# Patient Record
Sex: FEMALE | Race: WHITE | NOT HISPANIC OR LATINO | Employment: FULL TIME | URBAN - METROPOLITAN AREA
[De-identification: names, ages, dates, MRNs, and addresses within clinical notes are randomized per-mention and may not be internally consistent; named-entity substitution may affect disease eponyms.]

---

## 2019-08-22 ENCOUNTER — OFFICE VISIT (OUTPATIENT)
Dept: URGENT CARE | Facility: CLINIC | Age: 59
End: 2019-08-22
Payer: COMMERCIAL

## 2019-08-22 VITALS
RESPIRATION RATE: 10 BRPM | TEMPERATURE: 97 F | HEART RATE: 68 BPM | DIASTOLIC BLOOD PRESSURE: 84 MMHG | SYSTOLIC BLOOD PRESSURE: 118 MMHG | BODY MASS INDEX: 34.93 KG/M2 | HEIGHT: 61 IN | OXYGEN SATURATION: 99 % | WEIGHT: 185 LBS

## 2019-08-22 DIAGNOSIS — R42 VERTIGO: Primary | ICD-10-CM

## 2019-08-22 DIAGNOSIS — R11.0 NAUSEA: ICD-10-CM

## 2019-08-22 PROCEDURE — 3008F PR BODY MASS INDEX (BMI) DOCUMENTED: ICD-10-PCS | Mod: CPTII,S$GLB,, | Performed by: INTERNAL MEDICINE

## 2019-08-22 PROCEDURE — 99214 PR OFFICE/OUTPT VISIT, EST, LEVL IV, 30-39 MIN: ICD-10-PCS | Mod: 25,S$GLB,, | Performed by: INTERNAL MEDICINE

## 2019-08-22 PROCEDURE — S0119 PR ONDANSETRON, ORAL, 4MG: ICD-10-PCS | Mod: S$GLB,,, | Performed by: INTERNAL MEDICINE

## 2019-08-22 PROCEDURE — 96372 PR INJECTION,THERAP/PROPH/DIAG2ST, IM OR SUBCUT: ICD-10-PCS | Mod: S$GLB,,, | Performed by: INTERNAL MEDICINE

## 2019-08-22 PROCEDURE — 99214 OFFICE O/P EST MOD 30 MIN: CPT | Mod: 25,S$GLB,, | Performed by: INTERNAL MEDICINE

## 2019-08-22 PROCEDURE — 96372 THER/PROPH/DIAG INJ SC/IM: CPT | Mod: S$GLB,,, | Performed by: INTERNAL MEDICINE

## 2019-08-22 PROCEDURE — S0119 ONDANSETRON 4 MG: HCPCS | Mod: S$GLB,,, | Performed by: INTERNAL MEDICINE

## 2019-08-22 PROCEDURE — 3008F BODY MASS INDEX DOCD: CPT | Mod: CPTII,S$GLB,, | Performed by: INTERNAL MEDICINE

## 2019-08-22 RX ORDER — ERGOCALCIFEROL 1.25 MG/1
CAPSULE ORAL
Refills: 1 | COMMUNITY
Start: 2019-08-14

## 2019-08-22 RX ORDER — LEVOTHYROXINE SODIUM 88 UG/1
TABLET ORAL
COMMUNITY

## 2019-08-22 RX ORDER — ORPHENADRINE CITRATE 100 MG/1
TABLET, EXTENDED RELEASE ORAL
COMMUNITY

## 2019-08-22 RX ORDER — BETAMETHASONE SODIUM PHOSPHATE AND BETAMETHASONE ACETATE 3; 3 MG/ML; MG/ML
9 INJECTION, SUSPENSION INTRA-ARTICULAR; INTRALESIONAL; INTRAMUSCULAR; SOFT TISSUE ONCE
Status: COMPLETED | OUTPATIENT
Start: 2019-08-22 | End: 2019-08-22

## 2019-08-22 RX ORDER — ONDANSETRON 4 MG/1
4 TABLET, FILM COATED ORAL EVERY 8 HOURS PRN
Qty: 20 TABLET | Refills: 0 | Status: SHIPPED | OUTPATIENT
Start: 2019-08-22

## 2019-08-22 RX ORDER — FLUTICASONE FUROATE AND VILANTEROL TRIFENATATE 200; 25 UG/1; UG/1
POWDER RESPIRATORY (INHALATION)
Refills: 5 | COMMUNITY
Start: 2019-07-23

## 2019-08-22 RX ORDER — ALBUTEROL SULFATE 90 UG/1
AEROSOL, METERED RESPIRATORY (INHALATION)
COMMUNITY

## 2019-08-22 RX ORDER — HYDROXYZINE HYDROCHLORIDE 25 MG/1
TABLET, FILM COATED ORAL
Refills: 1 | COMMUNITY
Start: 2019-07-22

## 2019-08-22 RX ORDER — ONDANSETRON 4 MG/1
TABLET, FILM COATED ORAL
COMMUNITY

## 2019-08-22 RX ORDER — HYDROXYZINE HYDROCHLORIDE 25 MG/1
TABLET, FILM COATED ORAL
COMMUNITY

## 2019-08-22 RX ORDER — ONDANSETRON 4 MG/1
4 TABLET, ORALLY DISINTEGRATING ORAL
Status: COMPLETED | OUTPATIENT
Start: 2019-08-22 | End: 2019-08-22

## 2019-08-22 RX ORDER — LEVOTHYROXINE SODIUM 88 UG/1
TABLET ORAL
Refills: 1 | COMMUNITY
Start: 2019-07-22

## 2019-08-22 RX ORDER — ORPHENADRINE CITRATE 100 MG/1
TABLET, EXTENDED RELEASE ORAL
Refills: 1 | COMMUNITY
Start: 2019-06-30

## 2019-08-22 RX ORDER — LISINOPRIL 40 MG/1
TABLET ORAL
Refills: 1 | COMMUNITY
Start: 2019-06-05

## 2019-08-22 RX ORDER — MECLIZINE HYDROCHLORIDE 25 MG/1
25 TABLET ORAL 3 TIMES DAILY PRN
Qty: 30 TABLET | Refills: 0 | Status: SHIPPED | OUTPATIENT
Start: 2019-08-22

## 2019-08-22 RX ORDER — ERGOCALCIFEROL 1.25 MG/1
CAPSULE ORAL
COMMUNITY

## 2019-08-22 RX ORDER — FLUTICASONE FUROATE AND VILANTEROL 200; 25 UG/1; UG/1
POWDER RESPIRATORY (INHALATION)
COMMUNITY

## 2019-08-22 RX ORDER — LISINOPRIL 40 MG/1
TABLET ORAL
COMMUNITY

## 2019-08-22 RX ORDER — ONDANSETRON 4 MG/1
TABLET, FILM COATED ORAL
Refills: 0 | COMMUNITY
Start: 2019-06-05

## 2019-08-22 RX ADMIN — ONDANSETRON 4 MG: 4 TABLET, ORALLY DISINTEGRATING ORAL at 11:08

## 2019-08-22 RX ADMIN — BETAMETHASONE SODIUM PHOSPHATE AND BETAMETHASONE ACETATE 9 MG: 3; 3 INJECTION, SUSPENSION INTRA-ARTICULAR; INTRALESIONAL; INTRAMUSCULAR; SOFT TISSUE at 11:08

## 2019-08-22 NOTE — PROGRESS NOTES
"Subjective:       Patient ID: Lauren Ragland is a 59 y.o. female.    Vitals:  height is 5' 1" (1.549 m) and weight is 83.9 kg (185 lb). Her oral temperature is 97 °F (36.1 °C). Her blood pressure is 118/84 and her pulse is 68. Her respiration is 10 and oxygen saturation is 99%.     Chief Complaint: Dizziness    Pt c/o dizziness ( last night), nausea, and vomiting (3 times), start today   Pt saw chiropractor to day stated she had her neck adjusted;    Dizziness:   Chronicity:  New  Onset:  Yesterday  Progression since onset:  Unchanged  Frequency:  Constantly  Duration:  5 minutes  Dizziness characteristics:  Off-balance   Associated symptoms: nausea and vomiting.no fever, no headaches, no weakness, no light-headedness and no chest pain.  Exacerbated by: rotation of head; and standing up.  Treatments tried:  Nothing  Emesis    This is a new problem. The current episode started today. The problem occurs 2 to 4 times per day. The problem has been unchanged. The emesis has an appearance of stomach contents. Associated symptoms include chills, coughing and dizziness. Pertinent negatives include no arthralgias, chest pain, diarrhea, fever, headaches or myalgias. She has tried nothing for the symptoms.       Constitution: Positive for chills. Negative for fatigue and fever.   HENT: Positive for congestion (nasal congestion), postnasal drip and sinus pressure. Negative for sore throat.    Neck: Negative for painful lymph nodes.   Cardiovascular: Negative for chest pain and leg swelling.   Eyes: Negative for double vision and blurred vision.   Respiratory: Positive for cough. Negative for shortness of breath.    Gastrointestinal: Positive for nausea and vomiting. Negative for diarrhea.   Genitourinary: Negative for dysuria, frequency, urgency and history of kidney stones.   Musculoskeletal: Negative for joint pain, joint swelling, muscle cramps and muscle ache.   Skin: Negative for color change, pale, rash and bruising. "   Allergic/Immunologic: Negative for seasonal allergies.   Neurological: Positive for dizziness and loss of balance. Negative for history of vertigo, light-headedness, passing out and headaches.   Hematologic/Lymphatic: Negative for swollen lymph nodes.   Psychiatric/Behavioral: Negative for nervous/anxious, sleep disturbance and depression. The patient is not nervous/anxious.        Objective:      Physical Exam   Constitutional: She is oriented to person, place, and time. She appears well-developed and well-nourished.   HENT:   Head: Normocephalic and atraumatic.   Eyes: Pupils are equal, round, and reactive to light. Conjunctivae and EOM are normal.   Neck: Normal range of motion. Neck supple.   Cardiovascular: Normal rate and regular rhythm.   Pulmonary/Chest: Effort normal and breath sounds normal.   Neurological: She is alert and oriented to person, place, and time. She displays normal reflexes. No cranial nerve deficit or sensory deficit. She exhibits normal muscle tone. Coordination normal.   Nursing note and vitals reviewed.      Assessment:       1. Vertigo    2. Nausea        Plan:         Vertigo  -     betamethasone acetate-betamethasone sodium phosphate injection 9 mg  -     meclizine (ANTIVERT) 25 mg tablet; Take 1 tablet (25 mg total) by mouth 3 (three) times daily as needed.  Dispense: 30 tablet; Refill: 0    Nausea  -     ondansetron disintegrating tablet 4 mg  -     ondansetron (ZOFRAN) 4 MG tablet; Take 1 tablet (4 mg total) by mouth every 8 (eight) hours as needed for Nausea.  Dispense: 20 tablet; Refill: 0

## 2020-06-09 ENCOUNTER — OFFICE VISIT (OUTPATIENT)
Dept: URGENT CARE | Facility: CLINIC | Age: 60
End: 2020-06-09
Payer: COMMERCIAL

## 2020-06-09 VITALS
RESPIRATION RATE: 19 BRPM | HEART RATE: 83 BPM | SYSTOLIC BLOOD PRESSURE: 142 MMHG | HEIGHT: 61 IN | TEMPERATURE: 98 F | DIASTOLIC BLOOD PRESSURE: 91 MMHG | BODY MASS INDEX: 32.85 KG/M2 | WEIGHT: 174 LBS

## 2020-06-09 DIAGNOSIS — S91.219A LACERATION OF NAIL BED OF TOE, INITIAL ENCOUNTER: ICD-10-CM

## 2020-06-09 DIAGNOSIS — S91.209A TRAUMATIC AVULSION OF NAIL PLATE OF TOE, INITIAL ENCOUNTER: Primary | ICD-10-CM

## 2020-06-09 PROCEDURE — 11760 REPAIR OF NAIL BED: CPT | Mod: S$GLB,,, | Performed by: STUDENT IN AN ORGANIZED HEALTH CARE EDUCATION/TRAINING PROGRAM

## 2020-06-09 PROCEDURE — 11760 NAIL REMOVAL: ICD-10-PCS | Mod: S$GLB,,, | Performed by: STUDENT IN AN ORGANIZED HEALTH CARE EDUCATION/TRAINING PROGRAM

## 2020-06-09 PROCEDURE — 99214 OFFICE O/P EST MOD 30 MIN: CPT | Mod: 25,S$GLB,, | Performed by: STUDENT IN AN ORGANIZED HEALTH CARE EDUCATION/TRAINING PROGRAM

## 2020-06-09 PROCEDURE — 99214 PR OFFICE/OUTPT VISIT, EST, LEVL IV, 30-39 MIN: ICD-10-PCS | Mod: 25,S$GLB,, | Performed by: STUDENT IN AN ORGANIZED HEALTH CARE EDUCATION/TRAINING PROGRAM

## 2020-06-09 RX ORDER — MUPIROCIN 20 MG/G
OINTMENT TOPICAL 2 TIMES DAILY
Qty: 22 G | Refills: 0 | Status: SHIPPED | OUTPATIENT
Start: 2020-06-09 | End: 2020-06-14

## 2020-06-09 RX ORDER — HYDROCODONE BITARTRATE AND ACETAMINOPHEN 5; 325 MG/1; MG/1
1 TABLET ORAL EVERY 8 HOURS PRN
Qty: 5 TABLET | Refills: 0 | Status: SHIPPED | OUTPATIENT
Start: 2020-06-09 | End: 2020-06-11

## 2020-06-09 RX ORDER — ONDANSETRON 4 MG/1
4 TABLET, ORALLY DISINTEGRATING ORAL EVERY 6 HOURS PRN
Qty: 5 TABLET | Refills: 0 | Status: SHIPPED | OUTPATIENT
Start: 2020-06-09 | End: 2020-06-11

## 2020-06-09 RX ORDER — DOXYCYCLINE 100 MG/1
100 CAPSULE ORAL EVERY 12 HOURS
Qty: 10 CAPSULE | Refills: 0 | Status: SHIPPED | OUTPATIENT
Start: 2020-06-09 | End: 2020-06-14

## 2020-06-10 NOTE — PROGRESS NOTES
"Subjective:       Patient ID: Lauren Ragland is a 60 y.o. female.    Vitals:  height is 5' 1" (1.549 m) and weight is 78.9 kg (174 lb). Her tympanic temperature is 98.1 °F (36.7 °C). Her blood pressure is 142/91 (abnormal) and her pulse is 83. Her respiration is 19.     Chief Complaint: Foot Injury    Pt presents for L great toe injury. Reports ~1h PTA she opened a door and hit her L distal toe, now with bleeding and loose toenail. Denied toe numbness or weakness. States tetanus UTD.    Foot Injury    The incident occurred less than 1 hour ago. Incident location: at [facility]. The injury mechanism was a direct blow. The pain is present in the left toes and left foot. The quality of the pain is described as aching. The pain is at a severity of 10/10. The pain is severe. The pain has been constant since onset. Pertinent negatives include no inability to bear weight, loss of motion, muscle weakness or numbness. She reports no foreign bodies present. The symptoms are aggravated by movement, palpation and weight bearing. She has tried nothing for the symptoms. The treatment provided no relief.       Constitution: Negative for fatigue.   HENT: Negative for facial swelling and facial trauma.    Neck: Negative for neck stiffness.   Cardiovascular: Negative for chest trauma.   Eyes: Negative for eye trauma, double vision and blurred vision.   Gastrointestinal: Negative for abdominal trauma, abdominal pain and rectal bleeding.   Genitourinary: Negative for hematuria, missed menses, genital trauma and pelvic pain.   Musculoskeletal: Negative for pain, trauma, joint swelling and abnormal ROM of joint.   Skin: Positive for wound and avulsion. Negative for color change, abrasion, laceration and bruising.   Neurological: Negative for dizziness, history of vertigo, light-headedness, coordination disturbances, altered mental status, loss of consciousness and numbness.   Hematologic/Lymphatic: Negative for history of bleeding " "disorder.   Psychiatric/Behavioral: Negative for altered mental status, confusion, agitation and sleep disturbance.       Objective:       Vitals:    06/09/20 1915   BP: (!) 142/91   Pulse: 83   Resp: 19   Temp: 98.1 °F (36.7 °C)   TempSrc: Tympanic   SpO2: Comment: UTO-artificial nails   Weight: 78.9 kg (174 lb)   Height: 5' 1" (1.549 m)     Physical Exam   Constitutional: She is oriented to person, place, and time. She appears well-developed and well-nourished. No distress.   HENT:   Head: Normocephalic and atraumatic.   Right Ear: Hearing and external ear normal.   Left Ear: Hearing and external ear normal.   Nose: Nose normal.   Mouth/Throat: Mucous membranes are normal.   Eyes: Conjunctivae, EOM and lids are normal. Right eye exhibits no discharge. Left eye exhibits no discharge. No scleral icterus.   Neck: Normal range of motion. Neck supple.   Cardiovascular: Normal rate, regular rhythm and intact distal pulses.   Abdominal: Normal appearance.   Musculoskeletal: Normal range of motion. She exhibits tenderness (over L great toe injury). She exhibits no edema or deformity.   Neurological: She is alert and oriented to person, place, and time. No cranial nerve deficit (CN II-XII grossly intact) or sensory deficit. She exhibits normal muscle tone.   Skin: Skin is warm, dry, not pale and no rash.   L great toe with distal dorsal abrasion and toe nail avulsion of lateral edge and at nailbed matrix with injury to nailbed matrix; bleeding present, unresolved with 10 min pressure  After nail removal, small 1cm laceration near medial nail fold with bleeding, controlled with sutures   Psychiatric: She has a normal mood and affect. Her speech is normal and behavior is normal. Judgment and thought content normal. Cognition and memory are normal.   Nursing note and vitals reviewed.        Assessment:       1. Traumatic avulsion of nail plate of toe, initial encounter    2. Laceration of nail bed of toe, initial encounter  "       Plan:         Traumatic avulsion of nail plate of toe, initial encounter  -     doxycycline (VIBRAMYCIN) 100 MG Cap; Take 1 capsule (100 mg total) by mouth every 12 (twelve) hours. for 5 days  Dispense: 10 capsule; Refill: 0  -     ondansetron (ZOFRAN-ODT) 4 MG TbDL; Take 1 tablet (4 mg total) by mouth every 6 (six) hours as needed.  Dispense: 5 tablet; Refill: 0  -     HYDROcodone-acetaminophen (NORCO) 5-325 mg per tablet; Take 1 tablet by mouth every 8 (eight) hours as needed for Pain.  Dispense: 5 tablet; Refill: 0  -     Ambulatory referral/consult to Podiatry  -     mupirocin (BACTROBAN) 2 % ointment; Apply topically 2 (two) times daily. With dressing changes for 5 days  Dispense: 22 g; Refill: 0    Laceration of nail bed of toe, initial encounter  -     doxycycline (VIBRAMYCIN) 100 MG Cap; Take 1 capsule (100 mg total) by mouth every 12 (twelve) hours. for 5 days  Dispense: 10 capsule; Refill: 0  -     ondansetron (ZOFRAN-ODT) 4 MG TbDL; Take 1 tablet (4 mg total) by mouth every 6 (six) hours as needed.  Dispense: 5 tablet; Refill: 0  -     HYDROcodone-acetaminophen (NORCO) 5-325 mg per tablet; Take 1 tablet by mouth every 8 (eight) hours as needed for Pain.  Dispense: 5 tablet; Refill: 0  -     Ambulatory referral/consult to Podiatry  -     mupirocin (BACTROBAN) 2 % ointment; Apply topically 2 (two) times daily. With dressing changes for 5 days  Dispense: 22 g; Refill: 0    Medication, wound care, and diagnosis reviewed with patient, questions answered, and return precautions given    Follow up in about 5 days (around 6/14/2020) for suture removal.    Man Goddard MD/MPH  [facility]

## 2020-06-10 NOTE — PROCEDURES
Nail Removal  Date/Time: 6/9/2020 7:15 PM  Performed by: Man Goddard MD  Authorized by: Man Goddard MD     Consent Done?:  Yes (Verbal)    Location:  Left foot  Location detail:  Left big toe  Anesthesia:  Digital block  Local anesthetic: lidocaine 1% without epinephrine  Anesthetic total (ml):  2.5  Preparation:  Skin prepped with alcohol    Amount removed:  Complete  Wedge excision of skin of nail fold: No    Nail bed sutured?: Yes    Nail bed suture material: 5-0 monofilament.  Number of sutures:  3  Nail matrix removed:  None  Removed nail replaced and anchored: No    Dressing applied:  Antibiotic ointment and non-adhesive packing strip  Patient tolerance:  Patient tolerated the procedure well with no immediate complications

## 2020-06-10 NOTE — PATIENT INSTRUCTIONS
Detached Fingernail or Toenail  A detached nail is when the nail becomes  from the area underneath it (the nail bed). This often means losing all or part of the nail. An injury to your finger or toe is often the cause. It can also be caused by an infection around or under the nail.  Sometimes there is a cut in the nail bed or a bone fracture under the nail. In most cases, the nail will grow back from the area under the cuticle (the matrix). A fingernail takes about 4 to 6 months to grow back. A toenail takes about 12 months to grow back. If the nail bed or matrix was damaged, the nail may grow back with a rough or abnormal shape. In some cases the nail may not grow back at all.    There may be damage or a cut to the nail bed. This may need to be repaired. Often this is done with stitches. If the nail is still in good condition, it might be cleaned and trimmed, then put back in place. This is done to help and protect the new nail as it grows back. It also prevents the nail bed from drying out.  Home care  · Keep the injured part raised to reduce pain and swelling. This is important, especially in the first 48 hours.  · Apply an ice pack for up to 20 minutes. Do this every 3 to 6 hours during the first 24 to 48 hours. Keep using ice packs to ease pain and swelling as needed. To make an ice pack, put ice cubes in a plastic bag that seals at the top. Wrap the bag in a clean, thin towel or cloth. Never put ice or an ice pack directly on the skin. The ice pack can be put right on a wrap, cast, or splint. As the ice melts, be careful not to get any wrap, cast, or splint wet.  · The nail bed is moist, soft, and sensitive. It needs to be protected from injury for the first 7 to 10 days until it dries out and becomes hard. Keep it covered with a nonstick dressing or a bandage without adhesive.  · When a dressing is placed on an exposed nail bed, it may stick and be hard to remove if left in place more than 24  hours. Unless you were told otherwise, change dressings every 24 hours. If needed, soak the dressing off while holding it under warm running water. Then lightly pat the wound dry. Apply a layer of antibiotic ointment before putting on the new dressing or bandage. This will help keep it from sticking. Use a nonstick dressing with the antibiotic ointment under the outer dressing.  · If an X-ray showed that you have a fracture, it will take about 4 weeks for this to heal. The injured part should be protected with a splint or tape while it is healing.  · If you were given antibiotics to prevent infection, take them as directed until they are all gone.  Medicine  · You can take over-the-counter medicine for pain, unless you were given a different pain medicine to use. Talk with your provider before using these medicines if you have chronic liver or kidney disease. Also talk with your provider if you ever had a stomach ulcer or GI bleeding, or are taking blood thinner medicines.  · If you were given antibiotics, take them until they are done. It is important to finish the antibiotics even if the wound looks better. This is to make sure the infection has cleared.  Follow-up care  Follow up with your healthcare provider, or as advised. If X-rays were taken, you will be told of any new findings that may affect your care.  When to seek medical advice  Call your healthcare provider right away if any of these occur:  · Pain or swelling increases  · Redness around the nail  · Pus (creamy white or yellow fluid) draining from the nail  · Fever of 100.4ºF (38ºC) or higher, or as directed by your provider  Date Last Reviewed: 8/1/2016 © 2000-2017 [employer]. [address] [place] [address], [place] [address]. All rights reserved. This information is not intended as a substitute for professional medical care. Always follow your healthcare professional's instructions.

## 2020-06-17 ENCOUNTER — CLINICAL SUPPORT (OUTPATIENT)
Dept: URGENT CARE | Facility: CLINIC | Age: 60
End: 2020-06-17
Payer: COMMERCIAL

## 2020-06-17 VITALS
DIASTOLIC BLOOD PRESSURE: 92 MMHG | WEIGHT: 174 LBS | HEART RATE: 79 BPM | TEMPERATURE: 98 F | OXYGEN SATURATION: 96 % | SYSTOLIC BLOOD PRESSURE: 142 MMHG | BODY MASS INDEX: 32.85 KG/M2 | HEIGHT: 61 IN

## 2020-06-17 DIAGNOSIS — Z48.02 ENCOUNTER FOR REMOVAL OF SUTURES: Primary | ICD-10-CM

## 2020-06-17 PROCEDURE — 99024 SUTURE REMOVAL: ICD-10-PCS | Mod: S$GLB,,, | Performed by: PHYSICIAN ASSISTANT

## 2020-06-17 PROCEDURE — 99499 UNLISTED E&M SERVICE: CPT | Mod: S$GLB,,, | Performed by: PHYSICIAN ASSISTANT

## 2020-06-17 PROCEDURE — 99024 POSTOP FOLLOW-UP VISIT: CPT | Mod: S$GLB,,, | Performed by: PHYSICIAN ASSISTANT

## 2020-06-17 PROCEDURE — 99499 NO LOS: ICD-10-PCS | Mod: S$GLB,,, | Performed by: PHYSICIAN ASSISTANT

## 2020-06-17 NOTE — PROGRESS NOTES
"Subjective:       Patient ID: Lauren Ragland is a 60 y.o. female.    Vitals:  height is 5' 1" (1.549 m) and weight is 78.9 kg (174 lb). Her tympanic temperature is 97.5 °F (36.4 °C). Her blood pressure is 142/92 (abnormal) and her pulse is 79. Her oxygen saturation is 96%.     Chief Complaint: Suture / Staple Removal    Suture / Staple Removal  The sutures were placed 7 to 10 days ago (06/09/2020). She tried oral antibiotics and antibiotic ointment use since the wound repair. The treatment provided moderate relief. Her temperature was unmeasured prior to arrival. There has been no drainage from the wound. There is no redness present. There is no swelling present. There is no pain present. She has no difficulty moving the affected extremity or digit.       Constitution: Negative for chills, fatigue and fever.   HENT: Negative for congestion and sore throat.    Neck: Negative for painful lymph nodes.   Cardiovascular: Negative for chest pain and leg swelling.   Eyes: Negative for double vision and blurred vision.   Respiratory: Negative for cough and shortness of breath.    Gastrointestinal: Negative for nausea, vomiting and diarrhea.   Genitourinary: Negative for dysuria, frequency, urgency and history of kidney stones.   Musculoskeletal: Negative for joint pain, joint swelling, muscle cramps and muscle ache.   Skin: Negative for color change, pale, rash and bruising.   Allergic/Immunologic: Negative for seasonal allergies.   Neurological: Negative for dizziness, history of vertigo, light-headedness, passing out and headaches.   Hematologic/Lymphatic: Negative for swollen lymph nodes.   Psychiatric/Behavioral: Negative for nervous/anxious, sleep disturbance and depression. The patient is not nervous/anxious.        Objective:      Physical Exam   Skin:   3 sutures in place left great toe. No signs of infection. No redness or drainage         Assessment:       1. Encounter for removal of sutures        Plan:     "   3 sutures removed  No complications  No signs of infection    Encounter for removal of sutures  -     Suture Removal    Labs reviewed, pertinent imaging reviewed, previous medical records, medical history, surgical history, social history, family history reviewed.       Patient Instructions   Please return for any concerns      Please follow up with podiatry 1 week      Please return here or go to the Emergency Department for any concerns or worsening of condition.  If you were prescribed antibiotics, please take them to completion.  If you were prescribed a narcotic medication, do not drive or operate heavy equipment or machinery while taking these medications.  Please follow up with your primary care doctor or specialist as needed.    If you  smoke, please stop smoking.

## 2020-06-17 NOTE — PROCEDURES
Suture Removal    Date/Time: 6/17/2020 5:30 PM  Location procedure was performed: [facility]  Performed by: Jessica Stewart PA-C  Authorized by: Jessica Stewart PA-C   Body area: lower extremity  Location details: left big toe  Description of findings: well healed, no infection   Wound Appearance: clean and well healed  Sutures Removed: 3  Post-removal: bandaid applied  Facility: sutures placed in this facility  Complications: No  Estimated blood loss (mL): 0  Specimens: No  Implants: No  Patient tolerance: Patient tolerated the procedure well with no immediate complications

## 2020-06-17 NOTE — PATIENT INSTRUCTIONS
Please return for any concerns      Please follow up with podiatry 1 week      Please return here or go to the Emergency Department for any concerns or worsening of condition.  If you were prescribed antibiotics, please take them to completion.  If you were prescribed a narcotic medication, do not drive or operate heavy equipment or machinery while taking these medications.  Please follow up with your primary care doctor or specialist as needed.    If you  smoke, please stop smoking.